# Patient Record
Sex: FEMALE | Race: WHITE | NOT HISPANIC OR LATINO | URBAN - METROPOLITAN AREA
[De-identification: names, ages, dates, MRNs, and addresses within clinical notes are randomized per-mention and may not be internally consistent; named-entity substitution may affect disease eponyms.]

---

## 2023-06-28 ENCOUNTER — APPOINTMENT (RX ONLY)
Dept: URBAN - METROPOLITAN AREA CLINIC 53 | Facility: CLINIC | Age: 73
Setting detail: DERMATOLOGY
End: 2023-06-28

## 2023-06-28 DIAGNOSIS — D18.0 HEMANGIOMA: ICD-10-CM | Status: STABLE

## 2023-06-28 DIAGNOSIS — L29.89 OTHER PRURITUS: ICD-10-CM | Status: INADEQUATELY CONTROLLED

## 2023-06-28 DIAGNOSIS — D22 MELANOCYTIC NEVI: ICD-10-CM | Status: STABLE

## 2023-06-28 DIAGNOSIS — L29.8 OTHER PRURITUS: ICD-10-CM | Status: INADEQUATELY CONTROLLED

## 2023-06-28 DIAGNOSIS — L82.1 OTHER SEBORRHEIC KERATOSIS: ICD-10-CM | Status: STABLE

## 2023-06-28 DIAGNOSIS — L81.4 OTHER MELANIN HYPERPIGMENTATION: ICD-10-CM | Status: STABLE

## 2023-06-28 DIAGNOSIS — L20.89 OTHER ATOPIC DERMATITIS: ICD-10-CM | Status: INADEQUATELY CONTROLLED

## 2023-06-28 DIAGNOSIS — L57.8 OTHER SKIN CHANGES DUE TO CHRONIC EXPOSURE TO NONIONIZING RADIATION: ICD-10-CM | Status: STABLE

## 2023-06-28 PROBLEM — L20.84 INTRINSIC (ALLERGIC) ECZEMA: Status: ACTIVE | Noted: 2023-06-28

## 2023-06-28 PROBLEM — D22.5 MELANOCYTIC NEVI OF TRUNK: Status: ACTIVE | Noted: 2023-06-28

## 2023-06-28 PROBLEM — D23.5 OTHER BENIGN NEOPLASM OF SKIN OF TRUNK: Status: ACTIVE | Noted: 2023-06-28

## 2023-06-28 PROBLEM — D18.01 HEMANGIOMA OF SKIN AND SUBCUTANEOUS TISSUE: Status: ACTIVE | Noted: 2023-06-28

## 2023-06-28 PROCEDURE — ? PRESCRIPTION

## 2023-06-28 PROCEDURE — ? COUNSELING

## 2023-06-28 PROCEDURE — 99204 OFFICE O/P NEW MOD 45 MIN: CPT

## 2023-06-28 PROCEDURE — ? TREATMENT REGIMEN

## 2023-06-28 RX ORDER — TACROLIMUS 1 MG/G
OINTMENT TOPICAL
Qty: 60 | Refills: 3 | Status: ERX | COMMUNITY
Start: 2023-06-28

## 2023-06-28 RX ORDER — NAPHAZOLINE HYDROCHLORIDE AND PHENIRAMINE MALEATE .25; 3 MG/ML; MG/ML
SOLUTION/ DROPS OPHTHALMIC
Qty: 15 | Refills: 4 | Status: ERX | COMMUNITY
Start: 2023-06-28

## 2023-06-28 RX ADMIN — NAPHAZOLINE HYDROCHLORIDE AND PHENIRAMINE MALEATE: .25; 3 SOLUTION/ DROPS OPHTHALMIC at 00:00

## 2023-06-28 RX ADMIN — TACROLIMUS: 1 OINTMENT TOPICAL at 00:00

## 2023-06-28 ASSESSMENT — LOCATION SIMPLE DESCRIPTION DERM
LOCATION SIMPLE: LEFT CHEEK
LOCATION SIMPLE: LEFT SHOULDER
LOCATION SIMPLE: LEFT EYELID
LOCATION SIMPLE: LEFT UPPER BACK
LOCATION SIMPLE: RIGHT SHOULDER
LOCATION SIMPLE: LEFT LOWER BACK
LOCATION SIMPLE: RIGHT EYELID
LOCATION SIMPLE: CHEST
LOCATION SIMPLE: UPPER BACK
LOCATION SIMPLE: LEFT LIP
LOCATION SIMPLE: RIGHT FOREHEAD
LOCATION SIMPLE: RIGHT CHEEK
LOCATION SIMPLE: ABDOMEN
LOCATION SIMPLE: LEFT FOREHEAD

## 2023-06-28 ASSESSMENT — LOCATION DETAILED DESCRIPTION DERM
LOCATION DETAILED: EPIGASTRIC SKIN
LOCATION DETAILED: LEFT SUPERIOR MEDIAL MIDBACK
LOCATION DETAILED: RIGHT POSTERIOR SHOULDER
LOCATION DETAILED: LEFT POSTERIOR SHOULDER
LOCATION DETAILED: LEFT LATERAL CANTHUS
LOCATION DETAILED: SUPERIOR THORACIC SPINE
LOCATION DETAILED: LEFT ORAL COMMISSURE
LOCATION DETAILED: RIGHT LATERAL CANTHUS
LOCATION DETAILED: LEFT SUPERIOR LATERAL BUCCAL CHEEK
LOCATION DETAILED: LEFT INFERIOR UPPER BACK
LOCATION DETAILED: LEFT INFERIOR MEDIAL FOREHEAD
LOCATION DETAILED: RIGHT SUPERIOR MEDIAL BUCCAL CHEEK
LOCATION DETAILED: RIGHT MEDIAL FOREHEAD
LOCATION DETAILED: RIGHT LATERAL SUPERIOR CHEST

## 2023-06-28 ASSESSMENT — LOCATION ZONE DERM
LOCATION ZONE: LIP
LOCATION ZONE: EYELID
LOCATION ZONE: FACE
LOCATION ZONE: TRUNK
LOCATION ZONE: ARM

## 2024-11-14 ENCOUNTER — APPOINTMENT (RX ONLY)
Dept: URBAN - METROPOLITAN AREA CLINIC 53 | Facility: CLINIC | Age: 74
Setting detail: DERMATOLOGY
End: 2024-11-14

## 2024-11-14 DIAGNOSIS — L82.1 OTHER SEBORRHEIC KERATOSIS: ICD-10-CM | Status: STABLE

## 2024-11-14 DIAGNOSIS — L81.4 OTHER MELANIN HYPERPIGMENTATION: ICD-10-CM | Status: STABLE

## 2024-11-14 DIAGNOSIS — L57.8 OTHER SKIN CHANGES DUE TO CHRONIC EXPOSURE TO NONIONIZING RADIATION: ICD-10-CM | Status: STABLE

## 2024-11-14 DIAGNOSIS — L20.89 OTHER ATOPIC DERMATITIS: ICD-10-CM | Status: INADEQUATELY CONTROLLED

## 2024-11-14 DIAGNOSIS — D18.0 HEMANGIOMA: ICD-10-CM | Status: STABLE

## 2024-11-14 DIAGNOSIS — D22 MELANOCYTIC NEVI: ICD-10-CM | Status: STABLE

## 2024-11-14 DIAGNOSIS — Z41.9 ENCOUNTER FOR PROCEDURE FOR PURPOSES OTHER THAN REMEDYING HEALTH STATE, UNSPECIFIED: ICD-10-CM

## 2024-11-14 PROBLEM — D22.5 MELANOCYTIC NEVI OF TRUNK: Status: ACTIVE | Noted: 2024-11-14

## 2024-11-14 PROBLEM — D18.01 HEMANGIOMA OF SKIN AND SUBCUTANEOUS TISSUE: Status: ACTIVE | Noted: 2024-11-14

## 2024-11-14 PROCEDURE — ? COUNSELING

## 2024-11-14 PROCEDURE — ? ADDITIONAL NOTES

## 2024-11-14 PROCEDURE — ? PRESCRIPTION

## 2024-11-14 PROCEDURE — ? COSMETIC QUOTE

## 2024-11-14 PROCEDURE — ? PRODUCT LINE (OFFICE PRODUCTS)

## 2024-11-14 PROCEDURE — 99214 OFFICE O/P EST MOD 30 MIN: CPT

## 2024-11-14 PROCEDURE — ? TREATMENT REGIMEN

## 2024-11-14 PROCEDURE — ? COSMETIC CONSULTATION: HYDRAFACIAL

## 2024-11-14 PROCEDURE — ? RECOMMENDATIONS

## 2024-11-14 RX ORDER — TACROLIMUS 1 MG/G
OINTMENT TOPICAL
Qty: 60 | Refills: 3 | Status: ERX

## 2024-11-14 ASSESSMENT — LOCATION SIMPLE DESCRIPTION DERM
LOCATION SIMPLE: LEFT LIP
LOCATION SIMPLE: UPPER BACK
LOCATION SIMPLE: CHEST
LOCATION SIMPLE: RIGHT SHOULDER
LOCATION SIMPLE: LEFT CHEEK
LOCATION SIMPLE: LEFT FOREHEAD
LOCATION SIMPLE: RIGHT CHEEK
LOCATION SIMPLE: LEFT SHOULDER
LOCATION SIMPLE: LEFT UPPER BACK
LOCATION SIMPLE: LEFT EYELID
LOCATION SIMPLE: LEFT LOWER BACK
LOCATION SIMPLE: ABDOMEN
LOCATION SIMPLE: RIGHT EYELID

## 2024-11-14 ASSESSMENT — LOCATION ZONE DERM
LOCATION ZONE: TRUNK
LOCATION ZONE: EYELID
LOCATION ZONE: LIP
LOCATION ZONE: ARM
LOCATION ZONE: FACE
LOCATION ZONE: FACE

## 2024-11-14 ASSESSMENT — LOCATION DETAILED DESCRIPTION DERM
LOCATION DETAILED: LEFT SUPERIOR LATERAL BUCCAL CHEEK
LOCATION DETAILED: LEFT INFERIOR UPPER BACK
LOCATION DETAILED: EPIGASTRIC SKIN
LOCATION DETAILED: RIGHT POSTERIOR SHOULDER
LOCATION DETAILED: RIGHT LATERAL CANTHUS
LOCATION DETAILED: RIGHT SUPERIOR MEDIAL BUCCAL CHEEK
LOCATION DETAILED: RIGHT LATERAL SUPERIOR CHEST
LOCATION DETAILED: LEFT ORAL COMMISSURE
LOCATION DETAILED: LEFT INFERIOR MEDIAL FOREHEAD
LOCATION DETAILED: LEFT LATERAL CANTHUS
LOCATION DETAILED: LEFT POSTERIOR SHOULDER
LOCATION DETAILED: SUPERIOR THORACIC SPINE
LOCATION DETAILED: LEFT SUPERIOR MEDIAL MIDBACK

## 2024-11-14 NOTE — PROCEDURE: RECOMMENDATIONS
Recommendation Preamble: The following recommendations were made during the visit:
Detail Level: Zone
Recommendations (Free Text): Proscriptix calm and correct phyto serum, Proscriptix Ultra hydrating treatment, Prescription Moisture cream
Render Risk Assessment In Note?: no

## 2024-11-14 NOTE — PROCEDURE: ADDITIONAL NOTES
Additional Notes: Patient stated she has sensitive skin. Patient uses gentle cleanser.
Render Risk Assessment In Note?: no
Detail Level: Zone

## 2024-11-14 NOTE — PROCEDURE: COSMETIC QUOTE
Face Procedure 2 Percentage Discount: 0
Breast Procedure 10 Price/Unit (In Dollars- Use Only Numbers And Decimals): 0.00
Include Sales Tax On Facility Fees: No
Face Procedure 6 Price/Unit (In Dollars- Use Only Numbers And Decimals): 150
Misc Procedure 10 Units: 1
Misc Procedure 7: Obagi Tretinoin.05%
Body Procedure 1 Price/Unit (In Dollars- Use Only Numbers And Decimals): 350
Laser 14 Price/Unit (In Dollars- Use Only Numbers And Decimals): 350.00
Body Procedure 8: VI Peel Body lower legs
Face Procedure 3 Price/Unit (In Dollars- Use Only Numbers And Decimals): 100
Misc Procedure 4: ZO Pigment control and Brightening crème
Face Procedure 10: VI Peel Precision Plus
Body Procedure 5: VI Peel Body Right and Left Hands
Laser 8 Price/Unit (In Dollars- Use Only Numbers And Decimals): 450
Face Procedure 7: BioRepeel
Body Procedure 2: Hydrafacial Keravive half Scalp with Peptide spray
Laser 15: Venus IPL upper lip broken capillaries
Laser 2 Price/Unit (In Dollars- Use Only Numbers And Decimals): 650.00
Detail Level: Zone
Laser 12: Venus IPL Lentigines on chest
Face Procedure 4: Clinical extractions
Misc Procedure 8 Price/Unit (In Dollars- Use Only Numbers And Decimals): 68.00
Face Procedure 1: Hydrafacial Signature
Body Procedure 9 Price/Unit (In Dollars- Use Only Numbers And Decimals): 150.00
Laser 9: Venus IPL for broken capillaries
Body Procedure 6: VI Peel Body Upper legs
Misc Procedure 1: Proscriptix Activ Fx balancing cleanser
Laser 9 Price/Unit (In Dollars- Use Only Numbers And Decimals): 200.00
Face Procedure 1 Price/Unit (In Dollars- Use Only Numbers And Decimals): 199.00
Face Procedure 8: VI Peel advance
Body Procedure 3: SkinPen Microneedling Bikini area
Implant 2 Price/Unit (In Dollars- Use Only Numbers And Decimals): 124.00
Face Procedure 5: SkinPen Microneedling Perioral
Laser 13: Venus Viva Nanofractional add on Neck
Misc Procedure 9 Price/Unit (In Dollars- Use Only Numbers And Decimals): 110.00
Face Procedure 2: SkinPen Microneedling
Laser 10: Venus Viva Nanofractional scar
Include Sales Tax On Surgeon's Fees: Yes
Laser 7: Venus IPL neck add on
Misc Procedure 6 Price/Unit (In Dollars- Use Only Numbers And Decimals): 60.00
Body Procedure 7 Price/Unit (In Dollars- Use Only Numbers And Decimals): 100.00
Laser 4: Post care Products
Face Procedure 9 Price/Unit (In Dollars- Use Only Numbers And Decimals): 187.00
Misc Procedure 10 (Non-Sticky): Elta MD UV Restore Tinted
Laser 1: Venus IPL Face
Misc Procedure 10 Price/Unit (In Dollars- Use Only Numbers And Decimals): 48.00
Laser 14: Venus IPL chest
Laser 11: Venus IPL hemangiomas
Face Procedure 3: Dermaplane add on
Misc Procedure 7 Price/Unit (In Dollars- Use Only Numbers And Decimals): 92.00
Body Procedure 8 Price/Unit (In Dollars- Use Only Numbers And Decimals): 550.00
Laser 8: Venus Viva Nanofractional
Laser 5: Venus Viva Nanofractional Perioral
Face Procedure 10 Turcios/Unit (In Dollars- Use Only Numbers And Decimals): 389.00
Laser 2: Venus Viva  Nanofractional upper left and right extremity
Misc Procedure 8: Proscriptix calm + correct Phyto serum
Body Procedure 2 Price/Unit (In Dollars- Use Only Numbers And Decimals): 299.00
Face Procedure 7 Price/Unit (In Dollars- Use Only Numbers And Decimals): 250.00
Body Procedure 9: SkinPen Microneedling Acne scaring chest
Face Procedure 4 Price/Unit (In Dollars- Use Only Numbers And Decimals): 75.00
Misc Procedure 5: Proscriptix Densifi Fx Volumizing Shampoo
Laser 6: Venus Viva Nanofractional  around the Eyes
Misc Procedure 1 Price/Unit (In Dollars- Use Only Numbers And Decimals): 12.00
Misc Procedure 5 Price/Unit (In Dollars- Use Only Numbers And Decimals): 35.00
Laser 3: Venus IPL hands
Misc Procedure 2 Price/Unit (In Dollars- Use Only Numbers And Decimals): 44.00
Body Procedure 3 Price/Unit (In Dollars- Use Only Numbers And Decimals): 250
Face Procedure 8 Price/Unit (In Dollars- Use Only Numbers And Decimals): 369
Misc Procedure 9: Proscriptix Ultra Hydrating Serum
Body Procedure 10: SkinPen Microneedling scaring
Face Procedure 5 Price/Unit (In Dollars- Use Only Numbers And Decimals): 200
Laser 13 Price/Unit (In Dollars- Use Only Numbers And Decimals): 225.00
Misc Procedure 6: BioRelift
Body Procedure 7: SkinPen Microneedling Neck add on
Face Procedure 2 Price/Unit (In Dollars- Use Only Numbers And Decimals): 329.0
Misc Procedure 3: ZO Hydrating crème
Face Procedure 9: SkinPen Microneedling with PRP
Body Procedure 4: VI Peel Body Chest
Laser 4 Price/Unit (In Dollars- Use Only Numbers And Decimals): 50
Face Procedure 6: Benign Destruction
Body Procedure 1: VI Peel Body Scar

## 2024-11-14 NOTE — PROCEDURE: PRODUCT LINE (OFFICE PRODUCTS)
Product 15 Units: 0
Product 20 Price (In Dollars - Numeric Only, No Special Characters Or $): 0.00
Risk Of Complication Category: Low (OTC Medications)
Product 1 Application Directions: Double cleanse skin, Rinse with lukewarm water.
Name Of Product 4: Proscriptix lightweight cream
Product 6 Price (In Dollars - Numeric Only, No Special Characters Or $): 110.00
Allow Plan To Count Towards E/M Coding: Yes
Product 8 Application Directions: Gently massage through freshly cleansed hair.  Wait 1-2 minutes,rinse.
Name Of Product 11: Xtresse
Name Of Product 2: Proscriptix Charge C Serum
Product 4 Price (In Dollars - Numeric Only, No Special Characters Or $): 80.00
Name Of Product 9: Senova SystemscriptHurricane Party Pro.Pil FX
Product 11 Price (In Dollars - Numeric Only, No Special Characters Or $): 78.00
Product 6 Application Directions: Apply to clean skin once or twice daily.
Product 2 Price (In Dollars - Numeric Only, No Special Characters Or $): 148.00
Product 9 Price (In Dollars - Numeric Only, No Special Characters Or $): 46.00
Name Of Product 7: Proscriptix Densifi Fx Volumizing Shampoo
Product 4 Application Directions: After cleansing, apply once or twice daily
Product 11 Application Directions: Please take 1 gummy in the morning and 1 gummy at night
Product 9 Application Directions: Take 3 capsules once daily with food for a minimum of 3-6 months.
Product 2 Application Directions: Massage a thin layer over the desire area once or twice daily.
Product 7 Price (In Dollars - Numeric Only, No Special Characters Or $): 28.00
Name Of Product 5: Proscriptix Calm and Correct Phyto Serum
Name Of Product 3: Proscriptix Retinol Renewal 2.5X
Product 5 Price (In Dollars - Numeric Only, No Special Characters Or $): 68.00
Name Of Product 10: Proscriptix 5/2 acne pads
Detail Level: Zone
Product 7 Application Directions: Apply shampoo to thoroughly wet hair. Gently massage into scalp and hair. Lather for 1-2 minutes, rinse.
Name Of Product 1: Proscriptix Activ Fx Balancing Cleanser
Product 3 Price (In Dollars - Numeric Only, No Special Characters Or $): 115.00
Name Of Product 8: Proscriptix Densifi Fx Volumizing conditioner
Product 5 Units: 1
Product 10 Price (In Dollars - Numeric Only, No Special Characters Or $): 37.50
Product 1 Price (In Dollars - Numeric Only, No Special Characters Or $): 12.00
Product 5 Application Directions: Apply 2-3 drops to clean skin before moisturizer.
Name Of Product 6: Proscriptix Ultra Hydrating Treatment
Product 3 Application Directions: Apply to clean skin at night only.
Product 10 Application Directions: Wipe the entire affected area with a moist pad one to two times daily.  If bothersome dryness or peeling occurs reduce application to once a day or every other day.
Assigning Risk Information: Per AMA, level of risk is based upon consequences of the problem(s) addressed at the encounter when appropriately treated. Risk also includes medical decision making related to the need to initiate or forego further testing, treatment and/or hospitalization. Over the counter medication are assigned a risk level of low. Prescription medication management is assigned a risk level of moderate.